# Patient Record
Sex: FEMALE | Race: WHITE | URBAN - METROPOLITAN AREA
[De-identification: names, ages, dates, MRNs, and addresses within clinical notes are randomized per-mention and may not be internally consistent; named-entity substitution may affect disease eponyms.]

---

## 2018-10-05 ENCOUNTER — CARE COORDINATION (OUTPATIENT)
Dept: MATERNAL FETAL MEDICINE | Facility: CLINIC | Age: 33
End: 2018-10-05

## 2018-10-09 DIAGNOSIS — Z33.1 PREGNANCY, INCIDENTAL: Primary | ICD-10-CM

## 2018-10-24 ENCOUNTER — PRE VISIT (OUTPATIENT)
Dept: MATERNAL FETAL MEDICINE | Facility: CLINIC | Age: 33
End: 2018-10-24

## 2018-10-24 NOTE — PROGRESS NOTES
Sonia Smith was seen for an US and consultation today, which is summarized below:    Indication  ========    Failed medical  at 10 weeks.    Method  ======    Transabdominal ultrasound examination. View: Sufficient    Pregnancy  =========    Landa pregnancy. Number of fetuses: 1    Dating  ======    LMP on: 2018  GA by LMP 15 w + 0 d  LUIS FELIPE by LMP: 2019  Prior assessment on: 10/2/2018  GA at prior assessment date 12 w + 5 d  GA by prior assessment 16 w + 0 d  LUIS FELIPE by prior assessment: 2019  Ultrasound examination on: 10/25/2018  GA by U/S based upon: AC, BPD, Femur, HC  GA by U/S 16 w + 2 d  LUIS FELIPE by U/S: 2019  Assigned: Dating performed on 10/25/2018, based on the LMP  Assigned GA 15 w + 0 d  Assigned LUIS FELIPE: 2019  Pregnancy length 280 d    General Evaluation  ==============    Cardiac activity present.  bpm.  Fetal movements present.  Presentation transverse with head to maternal right.  Placenta posterior, low lying.  Umbilical cord 3 vessel cord.  Amniotic fluid Amount of AF: normal. MVP 2.5 cm. JOSÉ MIGUEL 7.8 cm. Q1 2.5 cm, Q2 1.8 cm, Q3 2.5 cm, Q4 1.0 cm.    Fetal Biometry  ============    Fetal Biometry  BPD 33.8 mm  16w 3d                Hadlock    OFD 45.7 mm  15w 6d                Nicolaides    .8 mm  16w 3d                Hadlock    Cerebellum tr 15.3 mm  15w 2d                Nicolaides    .1 mm  16w 5d                Hadlock    Femur 18.8 mm  15w 4d                Hadlock    Humerus 19.8 mm  15w 6d                Yevgeniy     g    EFW (lb) 0 lb  EFW (oz) 5 oz  EFW by: Hadlock (BPD-HC-AC-FL)  Head / Face / Neck   6.9 mm  CM 3.8 mm    Nasal bone 3.8 mm  Nuchal fold 3.0 mm  Other Structures   bpm    Fetal Anatomy  ===========    Cranium: normal  Lateral ventricles: normal  Choroid plexus: normal  Midline falx: normal  Cavum septi pellucidi: normal  Cerebellum: normal  Cisterna magna: normal  Head / Neck  Head size: normal  Head  "shape: normal  Thalami: normal  Neck: normal  Lips: not visualized  Profile: normal  4-chamber view: suboptimal  RVOT view: suboptimal  LVOT view: suboptimal  Heart / Thorax  Heart: normal  Cardiac position: levocardia (normal)  Cardiac size: normal (approx. 1/3 of thoracic area)  Cardiac rhythm: regular (normal)  Diaphragm: normal  Diaphragm: No apparent defect  Cord insertion: normal  Stomach: normal  Kidneys: normal  Bladder: normal  Genitals: normal  Abdomen  Stomach: left-sided  Cervical spine: suboptimal  Thoracic spine: suboptimal  Lumbar spine: suboptimal  Sacral spine: suboptimal  Arms: normal  Legs: normal  Gender: male  Wants to know gender: yes    Maternal Structures  ===============    Uterus / Cervix  Cervix: Normal  Cervix details: Appears Closed  Cervical length 56.0 mm  Ovaries / Tubes / Adnexa  Rt ovary: Normal  Lt ovary: Normal    Consultation  ==========    Type: MFM CONSULTATION  Dear Dr. Hobbs,    Thank you for requesting a Maternal-Fetal Medicine Consultation on Sonia Smith regarding her history of failed medical termination of  pregnancy.    Sonia Smith is a 33 year old  at 15w0d by LMP consistent with 12w5d US who is presenting for prenatal counseling in the setting  of failed medical .    This pregnancy is notable for:  - Failed medical : patient reports being given two different oral medication to induce a miscarriage at around 10 weeks gestation. She  had abdominal cramping, nausea, vomiting, and passage of large clots. However, the pregnancy was unsuccessfully terminated. She reports  that she now desires this pregnancy, as she was in a \"manic state\" at the time of her initial decision to have a pregnancy termination. US on  10/2 revealed normal appearing fetus at 12w5d.    I reviewed with the couple that the medication that Sonia was likely given was a combination of mifepristone and misoprostol, although I do  not have any records to confirm this. I " reviewed with the couple that while data regarding pregnancy outcomes after failed medical pregnancy  termination are limited, that use of both mifepristone and misoprostol are contraindication in pregnancy as both medication are abortifacient.  We reviewed that small series of women who have had live births after this combination of medications has demonstrated an increased risk for  major congenital malformations, with a risk of ~4.2%, which is greater than the baseline risk of ~2% in the general population. The couple state  they understand this risk, but pregnancy termination is not an option for them at this point as they feel that the termination was not successful  for a reason. Comprehensive anatomy US is recommended at 18-20 weeks gestation (which has been scheduled with [facility]) and serial growth  US every 4-6 weeks thereafter are recommended, as the effect on placentation from early exposure to mifepristone is not known. Placental  location will also be re-evaluate at the time of Sonia's US here at 18-20 weeks gestation.    We then discussed that the fetal bowel appeared echogenic on today's ultrasound. The presence of echogenic bowel gives an associated  relative risk for Down syndrome of 6.7. This finding increases your patient's risk for Down syndrome in this pregnancy to 1/41. Echogenic bowel  also increases the risk for Cystic Fibrosis to 1%. Echogenic bowel can also be associated with congenital infections. Alternatives available for  detecting fetal anomalies, aneuploidy and predicting developmental outcome for this pregnancy were thoroughly discussed. The risks, benefits  and limitations of maternal serum screening, cell-free DNA screening, ultrasound and genetic amniocentesis were thoroughly reviewed with the  patient. We discussed the availability of amniocentesis for the precise diagnosis of chromosomal abnormalities including the associated  procedure-related risk of pregnancy loss of 1/300 -  1/500. We discussed with the patient the availability of serologies for CMV and  toxoplasmosis. PCR of amniotic fluid can also be done. We also discussed the availability of Cystic Fibrosis carrier screening for the patient  and her partner. Sonia declined amniocentesis today, but did opt to proceed with cfDNA screening, CF carrier screening and CMV/toxo  serologies. These results will be forwarded to you as soon as they are available.    A copy of this consultation is being forwarded to your office.    Thank you for the opportunity to participate in the care of this patient. If you have questions regarding today s evaluation, or if we can be of  further service, please contact me at any time.    The patient was seen for an outpatient preconception consultation. The majority of time (>50%) was spent on counseling and coordination of  care of this patient and/or family members. Total face-to-face time was 15 minutes.    Impression  =========    1) Intrauterine pregnancy at 15 0/7 weeks gestational age.  2) The fetal bowel appears echogenic on US today. None of the other anomalies commonly detected by ultrasound were evident in the limited  fetal anatomic survey described above.  3) Growth parameters and estimated fetal weight were consistent with an appropriate for gestation age pattern of growth.  4) The amniotic fluid volume appeared normal.  5) The placenta is posterior and low lying.    Recommendation  ==============    We discussed the findings on today's ultrasound with the patient.    Please see consultation section for discussion and recommendations.    Return to primary provider for continued prenatal care.    Thank you for the opportunity to participate in the care of this patient. If you have questions regarding today's evaluation or if we can be of  further service, please contact the [facility] Medicine Center.    **Fetal anomalies may be present but not detected**    Skye Miles

## 2018-10-25 ENCOUNTER — OFFICE VISIT (OUTPATIENT)
Dept: MATERNAL FETAL MEDICINE | Facility: CLINIC | Age: 33
End: 2018-10-25
Attending: OBSTETRICS & GYNECOLOGY
Payer: COMMERCIAL

## 2018-10-25 ENCOUNTER — HOSPITAL ENCOUNTER (OUTPATIENT)
Dept: ULTRASOUND IMAGING | Facility: CLINIC | Age: 33
Discharge: HOME | End: 2018-10-25
Attending: OBSTETRICS & GYNECOLOGY | Admitting: OBSTETRICS & GYNECOLOGY
Payer: COMMERCIAL

## 2018-10-25 DIAGNOSIS — O09.522 SUPERVISION OF ELDERLY MULTIGRAVIDA IN SECOND TRIMESTER: ICD-10-CM

## 2018-10-25 DIAGNOSIS — O28.3 ABNORMAL PRENATAL ULTRASOUND: ICD-10-CM

## 2018-10-25 DIAGNOSIS — Z31.430 ENCOUNTER OF FEMALE FOR TESTING FOR GENETIC DISEASE CARRIER STATUS FOR PROCREATIVE MANAGEMENT: ICD-10-CM

## 2018-10-25 DIAGNOSIS — O28.3 ABNORMAL PRENATAL ULTRASOUND: Primary | ICD-10-CM

## 2018-10-25 DIAGNOSIS — Z33.1 PREGNANCY, INCIDENTAL: ICD-10-CM

## 2018-10-25 DIAGNOSIS — O09.891 MEDICATION EXPOSURE DURING FIRST TRIMESTER OF PREGNANCY: ICD-10-CM

## 2018-10-25 PROCEDURE — 40000954 ZZHCL STATISTIC COUNSYL FAMILY PREP: Performed by: OBSTETRICS & GYNECOLOGY

## 2018-10-25 PROCEDURE — 86406 PARTICLE AGGLUT ANTBDY TITR: CPT | Performed by: OBSTETRICS & GYNECOLOGY

## 2018-10-25 PROCEDURE — 86778 TOXOPLASMA ANTIBODY IGM: CPT | Performed by: OBSTETRICS & GYNECOLOGY

## 2018-10-25 PROCEDURE — 86777 TOXOPLASMA ANTIBODY: CPT | Performed by: OBSTETRICS & GYNECOLOGY

## 2018-10-25 PROCEDURE — 76801 OB US < 14 WKS SINGLE FETUS: CPT

## 2018-10-25 PROCEDURE — 36415 COLL VENOUS BLD VENIPUNCTURE: CPT | Performed by: OBSTETRICS & GYNECOLOGY

## 2018-10-25 PROCEDURE — 40000791 ZZHCL STATISTIC VERIFI PRENATAL TRISOMY 21,18,13: Performed by: OBSTETRICS & GYNECOLOGY

## 2018-10-25 PROCEDURE — 96040 ZZH GENETIC COUNSELING, EACH 30 MINUTES: CPT | Mod: ZF | Performed by: GENETIC COUNSELOR, MS

## 2018-10-25 PROCEDURE — 86644 CMV ANTIBODY: CPT | Performed by: OBSTETRICS & GYNECOLOGY

## 2018-10-25 PROCEDURE — 76805 OB US >/= 14 WKS SNGL FETUS: CPT

## 2018-10-25 PROCEDURE — 86645 CMV ANTIBODY IGM: CPT | Performed by: OBSTETRICS & GYNECOLOGY

## 2018-10-25 NOTE — MR AVS SNAPSHOT
After Visit Summary   10/25/2018    Sonia Smith    MRN: [id number]           Patient Information     Date Of Birth          1985        Visit Information        Provider Department      10/25/2018 12:45 PM Alli Brennan GC [facility]        Today's Diagnoses     Abnormal prenatal ultrasound        Supervision of elderly multigravida in second trimester        Encounter of female for testing for genetic disease carrier status for procreative management           Follow-ups after your visit        Your next 10 appointments already scheduled     Nov 16, 2018 10:15 AM CST   MFM US COMP with [id number]   [facility] Maternal Fetal Medicine Ultrasound - [facility])    [address]  [facility] [address] [phone]           Wear comfortable clothes and leave your valuables at home.            Nov 16, 2018 10:45 AM CST   Radiology MD with  MFM MD   [facility] Maternal Fetal Medicine [facility])    [address]  [facility] [address] [phone]           Please arrive at the time given for your first appointment. This visit is used internally to schedule the physician's time during your ultrasound.              Future tests that were ordered for you today     Open Future Orders        Priority Expected Expires Ordered    MFM US Comprehensive Single Routine 11/15/2018 8/25/2019 10/25/2018    Maternal Fetal OB Complete 2/3 Tri Sngle Routine 10/16/2018 8/9/2019 10/9/2018            Who to contact     If you have questions or need follow up information about today's clinic visit or your schedule please contact [facility]ETAL [facility] directly at [phone].  Normal or non-critical lab and imaging results will be communicated to you by MyChart, letter or phone within 4 business days after the clinic has received the results. If you do not hear from us  "within 7 days, please contact the clinic through [facility] or phone. If you have a critical or abnormal lab result, we will notify you by phone as soon as possible.  Submit refill requests through [facility] or call your pharmacy and they will forward the refill request to us. Please allow 3 business days for your refill to be completed.          Additional Information About Your Visit        [facility] Information     [facility] lets you send messages to your doctor, view your test results, renew your prescriptions, schedule appointments and more. To sign up, go to [email or web address] . Click on \"Log in\" on the left side of the screen, which will take you to the Welcome page. Then click on \"Sign up Now\" on the right side of the page.     You will be asked to enter the access code listed below, as well as some personal information. Please follow the directions to create your username and password.     Your access code is: [email or web address]  Expires: 2019  9:06 AM     Your access code will  in 90 days. If you need help or a new code, please call your [place] clinic or [phone].        Care EveryWhere ID     This is your Care EveryWhere ID. This could be used by other organizations to access your [place] medical records  [id number]        Your Vitals Were     Last Period                   2018            Blood Pressure from Last 3 Encounters:   No data found for BP    Weight from Last 3 Encounters:   No data found for Wt              We Performed the Following     [facility] Genetic Counseling        Primary Care Provider Office Phone # Fax #    Antolin Hobbs [phone]       [address] [address]        Equal Access to Services     [facility] Center[facility]E : Kerry Quiles, soila smith, qamanjinder do. So [facility] [phone].    ATENCIÓN: Si habla español, tiene a livingston disposición servicios " sherley de asistencia lingüística. Marina pizarro [phone].    We comply with applicable federal civil rights laws and [place] laws. We do not discriminate on the basis of race, color, national origin, age, disability, sex, sexual orientation, or gender identity.            Thank you!     Thank you for choosing [facility]  for your care. Our goal is always to provide you with excellent care. Hearing back from our patients is one way we can continue to improve our services. Please take a few minutes to complete the written survey that you may receive in the mail after your visit with us. Thank you!             Your Updated Medication List - Protect others around you: Learn how to safely use, store and throw away your medicines at www.disposemymeds.org.      Notice  As of 10/25/2018  4:29 PM    You have not been prescribed any medications.

## 2018-10-25 NOTE — PROGRESS NOTES
[facility]  Genetic Counseling Consult    Patient:  Sonia Smith YOB: 1985   Date of Service:  10/25/18      Sonia Smith was seen at the [facility] for genetic consultation as part of her appointment for comprehensive ultrasound and consultation for failed medical termination of pregnancy.  Sonia was accompanied to her appointment today by her  Chidi.         Impression/Plan:   1. Sonia had a comprehensive (level II) ultrasound today and consultation with [facility] physician Dr. Miles.  Today's ultrasound noted an echogenic bowel.  Please see the ultrasound report for further details.  Genetic counseling was asked to meet with Sonia and her partner to arrange testing given this finding.      2. The patient had a blood draw for NIPT (Innatal test through [facility]).  Results are expected within 7-10 days, and will be available in [facility].  We will contact her to discuss the results, and a copy will be forwarded to the office of the referring OB provider.     3. Sonia had blood drawn for expanded carrier screening (Universal Panel + Fragile X Syndrome through [employer]) Results are expected within 10-14 days, and will be available in [facility].  We will contact her to discuss the results, and a copy will be forwarded to the office of the referring OB provider.     4. Sonia had blood drawn for CMV and Toxoplasmosis testing.  Results will be available in [facility] and we will contact her to discuss the results, and a copy will be forwarded to the office of the referring OB provider.    Pregnancy History:   /Parity:    Age at Delivery: 33 year old  LUIS FELIPE: 2019, by Last Menstrual Period  Gestational Age: 15w0d    Sonia's ongoing pregnancy was exposed to medications used to induce medical termination of pregnancy.  Although she did not recall the specific medications, they were likely misoprostol  and mifepristone.  We discussed that in instances where this medication fails to end the pregnancy, there is limited data for outcomes for the ongoing pregnancy, but there is data that suggests a slight increase in major congenital malformations. We discussed further comprehensive ultrasounds as the recommended screening moving forward, and Sonia will return to [facility] in approximately 3 weeks for a follow up comp.       Today's pregnancy noted an echogenic bowel.  This finding is considered a soft marker for aneuploidy, meaning that it increases the likelihood for a pregnancy to be affected with a chromosome abnormality, in particular Down syndrome.  It is also an indicator of increased risk for cystic fibrosis as well as infections affecting the pregnancy.  Snoia opted to have screening for chromosome abnormalities, cystic fibrosis, CMV, and Toxoplasmosis given this finding.  We discussed that echogenic bowel can be caused by the fetus ingesting blood, and that given the bleeding episode associated with the mifepristone/misoprostol exposure, it is likely that the pregnancy is not affected with any of these conditions, but testing will help clarify those risks.      Medical History:   Sonia s reported medical history is not expected to impact pregnancy management or risks to fetal development.          Carrier Screening:       The patient elected to pursue carrier screening today.  Her blood was drawn for expanded carrier screening (universal panel + Fragile X syndrome (176 conditions total) and sent to [facility].  We will contact her when these test results become available, and a copy of these results will be relayed to her primary OB provider.       Risk Assessment for Chromosome Conditions:   We explained that the risk for fetal chromosome abnormalities increases with maternal age. We discussed specific features of common chromosome abnormalities, including Down syndrome, trisomy 13, trisomy 18, and  sex chromosome trisomies.      - At age 33 at midtrimester, the risk to have a baby with Down syndrome is 1 in 274.     - At age 33 at midtrimester, the risk to have a baby with any chromosome abnormality is 1 in 135.       Sonia did not have maternal serum screening earlier in pregnancy.     We discussed that markers identified on a level II ultrasound are findings that are used to adjust risk for chromosome abnormalities.  We discussed the specific finding that was identified today, echogenic bowel.  This finding is a common variant in the general population and seen in as many as 1% of pregnancies.  It is not expected to cause any complications or concerns on its own, but we discussed that when it is identified, it may increase the risk for a pregnancy to be affected with Down syndrome.  We discussed that the relative risk increase for an EIF is as high as 6.7.  Based on a current age related risk of 1/274, the adjusted risk for Sonia s pregnancy to be affected with Down syndrome is 1/41, or slightly under 2.5%.  We discussed that conversely there is a 97.5% chance that the pregnancy does not have Down syndrome.           Testing Options:   We discussed the following options:   Non-invasive Prenatal Testing (NIPT)    Maternal plasma cell-free DNA testing; first trimester ultrasound with nuchal translucency and nasal bone assessment is recommended, when appropriate    Screens for fetal trisomy 21, trisomy 13, trisomy 18, and sex chromosome aneuploidy    Cannot screen for open neural tube defects; maternal serum AFP after 15 weeks is recommended       Genetic Amniocentesis    Invasive procedure typically performed in the second trimester by which amniotic fluid is obtained for the purpose of chromosome analysis and/or other prenatal genetic analysis    Diagnostic results; >99% sensitivity for fetal chromosome abnormalities    AFAFP measurement tests for open neural tube defects       Comprehensive (Level II)  ultrasound:    Detailed ultrasound performed between 18-22 weeks gestation    Screen for major birth defects and markers for aneuploidy    We reviewed the benefits and limitations of this testing.  Screening tests provide a risk assessment specific to the pregnancy for certain fetal chromosome abnormalities, but cannot definitively diagnose or exclude a fetal chromosome abnormality.  Follow-up genetic counseling and consideration of diagnostic testing is recommended with any abnormal screening result. Diagnostic tests carry inherent risks- including risk of miscarriage- that require careful consideration.  These tests can detect fetal chromosome abnormalities with greater than 99% certainty.  Results can be compromised by maternal cell contamination or mosaicism, and are limited by the resolution of cytogenetic G-banding technology.  There is no screening nor diagnostic test that can detect all forms of birth defects or mental disability.    It was a pleasure to be involved with Sonia s care. Face-to-face time of the meeting was 30 minutes.    Alli Brennan MS, [facility]  Licensed Genetic Counselor  Phone: [phone]  Pager: [phone]

## 2018-10-25 NOTE — MR AVS SNAPSHOT
After Visit Summary   10/25/2018    Sonia Smith    MRN: [id number]           Patient Information     Date Of Birth          1985        Visit Information        Provider Department      10/25/2018 10:15 AM Skye Miles MD [facility] Fetal [facility]        Today's Diagnoses     Medication exposure during first trimester of pregnancy           Follow-ups after your visit        Your next 10 appointments already scheduled     Nov 16, 2018 10:15 AM CST   MFM US COMP with [id number]   [facility] Maternal Fetal Medicine Ultrasound - [facility])    [address]  [facility] [address] [phone]           Wear comfortable clothes and leave your valuables at home.            Nov 16, 2018 10:45 AM CST   Radiology MD with  MFADELAIDE BLANCHARD   [facility] Maternal Fetal Medicine [facility])    [address]  [facility] [id number] [phone]           Please arrive at the time given for your first appointment. This visit is used internally to schedule the physician's time during your ultrasound.              Future tests that were ordered for you today     Open Future Orders        Priority Expected Expires Ordered    MFM US Comprehensive Single Routine 11/15/2018 8/25/2019 10/25/2018    Maternal Fetal OB Complete 2/3 Tri Sngle Routine 10/16/2018 8/9/2019 10/9/2018            Who to contact     If you have questions or need follow up information about today's clinic visit or your schedule please contact [facility] FETAL [facility] directly at [phone].  Normal or non-critical lab and imaging results will be communicated to you by MyChart, letter or phone within 4 business days after the clinic has received the results. If you do not hear from us within 7 days, please contact the clinic through MyChart or phone. If you have a critical or abnormal lab result, we will notify you by phone as  "soon as possible.  Submit refill requests through [employer] or call your pharmacy and they will forward the refill request to us. Please allow 3 business days for your refill to be completed.          Additional Information About Your Visit        [employer] Information     [employer] lets you send messages to your doctor, view your test results, renew your prescriptions, schedule appointments and more. To sign up, go to [email or web address] . Click on \"Log in\" on the left side of the screen, which will take you to the Welcome page. Then click on \"Sign up Now\" on the right side of the page.     You will be asked to enter the access code listed below, as well as some personal information. Please follow the directions to create your username and password.     Your access code is: [email or web address]  Expires: 2019  9:06 AM     Your access code will  in 90 days. If you need help or a new code, please call your [place] clinic or [phone].        Care EveryWhere ID     This is your Care EveryWhere ID. This could be used by other organizations to access your [place] medical records  [id number]        Your Vitals Were     Last Period                   2018            Blood Pressure from Last 3 Encounters:   No data found for BP    Weight from Last 3 Encounters:   No data found for Wt              We Performed the Following     MFM Office Visit        Primary Care Provider Office Phone # Fax #    Antolin Hobbs [phone]       [address] [address]        Equal Access to Services     [facility] Hospital[facility] : Hadii aad ku hadasho Soomaali, waaxda luqadaha, qaybta kaalmada adeegyada, manjinder harvey . So [facility] [phone].    ATENCIÓN: Si habla español, tiene a livingston disposición servicios gratuitos de asistencia lingüística. Llame al [phone].    We comply with applicable federal civil rights laws and [place] laws. We do not " discriminate on the basis of race, color, national origin, age, disability, sex, sexual orientation, or gender identity.            Thank you!     Thank you for choosing [facility]  for your care. Our goal is always to provide you with excellent care. Hearing back from our patients is one way we can continue to improve our services. Please take a few minutes to complete the written survey that you may receive in the mail after your visit with us. Thank you!             Your Updated Medication List - Protect others around you: Learn how to safely use, store and throw away your medicines at www.disposemymeds.org.      Notice  As of 10/25/2018  4:04 PM    You have not been prescribed any medications.

## 2018-10-26 LAB
CMV IGG SERPL QL IA: >8 AI (ref 0–0.8)
CMV IGM SERPL QL IA: <0.2 AI (ref 0–0.8)

## 2018-10-31 ENCOUNTER — TELEPHONE (OUTPATIENT)
Dept: MATERNAL FETAL MEDICINE | Facility: CLINIC | Age: 33
End: 2018-10-31

## 2018-10-31 LAB — LAB SCANNED RESULT: NORMAL

## 2018-11-01 NOTE — TELEPHONE ENCOUNTER
11/1/2018       Called Sonia to discuss NIPT results.  Results came back negative for chromosome abnormalities in chromosomes 21, 18, & 13, as well as the sex chromosomes.  These test results do not definitively rule out the possibility of one of these conditions, but they do greatly reduce the likelihood.  The test identified sex chromosomes consistent with male sex (XY), which is consistent with ultrasound.  We reviewed that Sonia's CMV antibody testing indicates a previous infection but no evidence for ongoing infection (Positive IgG, Negative IgM).  Still pending are Sonia's carrier screening and toxoplasmosis testing.  Sonia will be contacted with these results as the become available.  Sonia had no questions at this time and was encouraged to reach out if she has any questions or concerns in the future.       Alli Brennan MS, [facility]  Licensed Genetic Counselor  Phone: [phone]  Pager: [phone]

## 2018-11-08 ENCOUNTER — TELEPHONE (OUTPATIENT)
Dept: MATERNAL FETAL MEDICINE | Facility: CLINIC | Age: 33
End: 2018-11-08

## 2018-11-08 LAB — LAB SCANNED RESULT: NORMAL

## 2018-11-08 NOTE — TELEPHONE ENCOUNTER
11/8/2018    Called Sonia to discuss results from her carrier screening and toxoplasmosis infection study.  Toxo results are negative, no indication of past or present infection.  Carrier screening results are negative for all 176 conditions assessed (Counsyl Foresight [place] Panel + Fragile X syndrome).  All testing for Sonia thus far has been negative.  Sonia confirmed her upcoming appointment for comprehensive ultrasound and was encouraged to contact our clinic with any questions or concerns moving forward.     Alli Brennan MS, [facility]  Licensed Genetic Counselor  Phone: [phone]  Pager: [phone]

## 2018-11-12 LAB — LAB SCANNED RESULT: NORMAL

## 2018-11-16 ENCOUNTER — HOSPITAL ENCOUNTER (OUTPATIENT)
Dept: ULTRASOUND IMAGING | Facility: CLINIC | Age: 33
Discharge: HOME | End: 2018-11-16
Attending: OBSTETRICS & GYNECOLOGY | Admitting: OBSTETRICS & GYNECOLOGY
Payer: COMMERCIAL

## 2018-11-16 ENCOUNTER — OFFICE VISIT (OUTPATIENT)
Dept: MATERNAL FETAL MEDICINE | Facility: CLINIC | Age: 33
End: 2018-11-16
Attending: OBSTETRICS & GYNECOLOGY
Payer: COMMERCIAL

## 2018-11-16 DIAGNOSIS — O35.9XX0 SUSPECTED FETAL ANOMALY, ANTEPARTUM, SINGLE OR UNSPECIFIED FETUS: ICD-10-CM

## 2018-11-16 DIAGNOSIS — O09.891 MEDICATION EXPOSURE DURING FIRST TRIMESTER OF PREGNANCY: ICD-10-CM

## 2018-11-16 DIAGNOSIS — O09.891 MEDICATION EXPOSURE DURING FIRST TRIMESTER OF PREGNANCY: Primary | ICD-10-CM

## 2018-11-16 PROCEDURE — 76811 OB US DETAILED SNGL FETUS: CPT

## 2018-11-16 NOTE — PROGRESS NOTES
"Please see \"Imaging\" tab under \"Chart Review\" for details of today's US at the [facility].    Travis Clarke MD  Maternal-Fetal Medicine    "

## 2018-11-16 NOTE — MR AVS SNAPSHOT
After Visit Summary   11/16/2018    Sonia Smith    MRN: [id number]           Patient Information     Date Of Birth          1985        Visit Information        Provider Department      11/16/2018 10:45 AM Travis Clarke MD [facility] Maternal Fetal Medicine [facility]        Today's Diagnoses     Medication exposure during first trimester of pregnancy    -  1    Suspected fetal anomaly, antepartum, single or unspecified fetus           Follow-ups after your visit        Your next 10 appointments already scheduled     Dec 12, 2018 10:15 AM CST   MFM US COMPRE SINGLE F/U with [id number]   [facility] Maternal Fetal Medicine Ultrasound - [facility] ([facility])    [address]  [facility] [address] [phone]           Wear comfortable clothes and leave your valuables at home.            Dec 12, 2018 10:45 AM CST   Radiology MD with  MFM MD   [facility] Maternal Fetal Medicine [facility] ([facility])    [address]  [facility] [address] [phone]           Please arrive at the time given for your first appointment. This visit is used internally to schedule the physician's time during your ultrasound.              Future tests that were ordered for you today     Open Future Orders        Priority Expected Expires Ordered    MFM US Comprehensive Single F/U Routine 12/14/2018 11/16/2019 11/16/2018            Who to contact     If you have questions or need follow up information about today's clinic visit or your schedule please contact [facility] FETAL [facility] directly at [phone].  Normal or non-critical lab and imaging results will be communicated to you by MyChart, letter or phone within 4 business days after the clinic has received the results. If you do not hear from us within 7 days, please contact the clinic through MyChart or phone. If you have a critical or abnormal lab result, we will notify you by  "phone as soon as possible.  Submit refill requests through [employer] or call your pharmacy and they will forward the refill request to us. Please allow 3 business days for your refill to be completed.          Additional Information About Your Visit        [employer] Information     [employer] lets you send messages to your doctor, view your test results, renew your prescriptions, schedule appointments and more. To sign up, go to [email or web address] . Click on \"Log in\" on the left side of the screen, which will take you to the Welcome page. Then click on \"Sign up Now\" on the right side of the page.     You will be asked to enter the access code listed below, as well as some personal information. Please follow the directions to create your username and password.     Your access code is: [email or web address]  Expires: 2019  8:06 AM     Your access code will  in 90 days. If you need help or a new code, please call your [facility] or [phone].        Care EveryWhere ID     This is your Care EveryWhere ID. This could be used by other organizations to access your [facility] records  [id number]        Your Vitals Were     Last Period                   2018            Blood Pressure from Last 3 Encounters:   No data found for BP    Weight from Last 3 Encounters:   No data found for Wt               Primary Care Provider Office Phone # Fax #    Antolin Hobbs [phone]       [address] [address]        Equal Access to Services     IFRAH CHISHOLM : Hadii ha almaguer hadasho Sodaliali, waaxda luqadaha, qaybta kaalmada mohityasharon, manjinder harvey . So [facility] [phone].    ATENCIÓN: Si habla español, tiene a livingtson disposición servicios gratuitos de asistencia lingüística. Llame al [phone].    We comply with applicable federal civil rights laws and [place] laws. We do not discriminate on the basis of race, color, national " origin, age, disability, sex, sexual orientation, or gender identity.            Thank you!     Thank you for choosing [facility]  for your care. Our goal is always to provide you with excellent care. Hearing back from our patients is one way we can continue to improve our services. Please take a few minutes to complete the written survey that you may receive in the mail after your visit with us. Thank you!             Your Updated Medication List - Protect others around you: Learn how to safely use, store and throw away your medicines at www.disposemymeds.org.      Notice  As of 11/16/2018 11:25 AM    You have not been prescribed any medications.

## 2018-12-12 ENCOUNTER — HOSPITAL ENCOUNTER (OUTPATIENT)
Dept: ULTRASOUND IMAGING | Facility: CLINIC | Age: 33
Discharge: HOME | End: 2018-12-12
Attending: OBSTETRICS & GYNECOLOGY | Admitting: OBSTETRICS & GYNECOLOGY
Payer: COMMERCIAL

## 2018-12-12 ENCOUNTER — OFFICE VISIT (OUTPATIENT)
Dept: MATERNAL FETAL MEDICINE | Facility: CLINIC | Age: 33
End: 2018-12-12
Attending: OBSTETRICS & GYNECOLOGY
Payer: COMMERCIAL

## 2018-12-12 DIAGNOSIS — O09.891 MEDICATION EXPOSURE DURING FIRST TRIMESTER OF PREGNANCY: Primary | ICD-10-CM

## 2018-12-12 DIAGNOSIS — O09.522 ELDERLY MULTIGRAVIDA IN SECOND TRIMESTER: ICD-10-CM

## 2018-12-12 DIAGNOSIS — O35.9XX0 SUSPECTED FETAL ANOMALY, ANTEPARTUM, SINGLE OR UNSPECIFIED FETUS: ICD-10-CM

## 2018-12-12 PROCEDURE — 76816 OB US FOLLOW-UP PER FETUS: CPT

## 2018-12-12 NOTE — PROGRESS NOTES
"Please see \"Imaging\" tab under \"Chart Review\" for details of today's US.      Pam Duckworth, DO  Maternal-Fetal Medicine        "

## 2019-01-01 NOTE — PROGRESS NOTES
Pt presents to [facility] for assessment and evaluation of her pregnancy due to failed medical  at 10 weeks. Pt had us done today and measuring slightly larger than dates. See us report that was read by Dr. Miles. Pt had consult by Dr. Miles and Dr. Roland. See note in epic for today's discussion re us findings and plan of care for pregnancy. Pt also met with genetic counseling. Sent to lab for blood work. Aware she will be called with results when they are available. Pt will have a L2 at 18-20 weeks at [facility]. Questions answered. Discharged stable at this time. Susanne Pollack RN   Statement Selected

## 2019-01-09 ENCOUNTER — HOSPITAL ENCOUNTER (OUTPATIENT)
Dept: ULTRASOUND IMAGING | Facility: CLINIC | Age: 34
Discharge: HOME | End: 2019-01-09
Attending: OBSTETRICS & GYNECOLOGY | Admitting: OBSTETRICS & GYNECOLOGY
Payer: COMMERCIAL

## 2019-01-09 ENCOUNTER — OFFICE VISIT (OUTPATIENT)
Dept: MATERNAL FETAL MEDICINE | Facility: CLINIC | Age: 34
End: 2019-01-09
Attending: OBSTETRICS & GYNECOLOGY
Payer: COMMERCIAL

## 2019-01-09 ENCOUNTER — TELEPHONE (OUTPATIENT)
Dept: MATERNAL FETAL MEDICINE | Facility: CLINIC | Age: 34
End: 2019-01-09

## 2019-01-09 DIAGNOSIS — O09.891 MEDICATION EXPOSURE DURING FIRST TRIMESTER OF PREGNANCY: Primary | ICD-10-CM

## 2019-01-09 DIAGNOSIS — O09.891 MEDICATION EXPOSURE DURING FIRST TRIMESTER OF PREGNANCY: ICD-10-CM

## 2019-01-09 PROCEDURE — 76816 OB US FOLLOW-UP PER FETUS: CPT

## 2019-01-09 NOTE — PROGRESS NOTES
Please see full imaging report from [employer] program under imaging tab.    Madhav Black MD  Maternal Fetal Medicine